# Patient Record
Sex: FEMALE | Race: WHITE | NOT HISPANIC OR LATINO | Employment: STUDENT | URBAN - METROPOLITAN AREA
[De-identification: names, ages, dates, MRNs, and addresses within clinical notes are randomized per-mention and may not be internally consistent; named-entity substitution may affect disease eponyms.]

---
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
## 2024-01-02 ENCOUNTER — PATIENT OUTREACH (OUTPATIENT)
Dept: CARE COORDINATION | Facility: CLINIC | Age: 19
End: 2024-01-02
Payer: COMMERCIAL

## 2024-01-02 NOTE — PROGRESS NOTES
[facility]/Voicemail    Clinical Data: Care Coordinator Outreach    Outreach Documentation Number of Outreach Attempt   1/2/2024   2:13 PM 1       Left message on patient's voicemail with call back information and requested return call.    Plan:   Care Coordinator will try to reach patient again in 3-5 business days.    Next outreach due 1/9/24

## 2024-01-10 ENCOUNTER — PATIENT OUTREACH (OUTPATIENT)
Dept: CARE COORDINATION | Facility: CLINIC | Age: 19
End: 2024-01-10
Payer: COMMERCIAL

## 2024-01-10 NOTE — PROGRESS NOTES
Clinic Care Coordination Contact    Situation: Patient chart reviewed by care coordinator.    Background: Patient is enrolled     Assessment: CHW attempting to reach patient for update     Plan/Recommendations: SW will review in 1-2 weeks     GÉNESIS Vargas, MSW   [facility]  Care Coordination  [facility]  [phone]  1/10/2024 10:39 AM

## 2024-01-10 NOTE — LETTER
[facility] CARE COORDINATION  [address] [facility] [place] [address]   January 10, 2024        Patty Alejandre  [address] [address] [address]          Dear aPtty,     Gisella is an updated Patient Centered Plan of Care for your continued enrollment in Care Coordination. Please let us know if you have additional questions, concerns, or goals that we can assist with.    Sincerely,    Vaishnavi De Jesus, DAVIDW, MSW Clinic   [facility]  Care Coordination  [facility]   [email or web address]  Office: [phone]  Employed by [employer] [facility]  Patient Centered Plan of Care  About Me:        Patient Name:  Patty Alejandre    YOB: 2005  Age:         18 year old   [place] MRN:    [id number] Telephone Information:  Home Phone [phone]   Mobile [phone]       Address:  [address] [place] [address] Email address:  [email or web address]      Emergency Contact(s)    Name Relationship Lgl Grd Work Phone Home Phone Mobile Phone   1. DEYANIRA ALEJANDRE Mother   [phone]    2. MIMI ALEJANDRE Father   [phone]   3. ALONSO PEACOCK Grandparent   [phone]            Primary language:  English     needed? No   [place] [employer]:  [phone] op. 1  Other communication barriers:Cognitive impairment; Other (cannot write much, cannot talk on the phone)    Preferred Method of Communication:  Phone  Current living arrangement: I live in a private home with family (parents)    Mobility Status/ Medical Equipment: Independent        Health Maintenance  Health Maintenance Reviewed: Due/Overdue   Health Maintenance Due   Topic Date Due    COVID-19 Vaccine (1) Never done    HIV SCREENING  Never done    PHQ-2 (once per calendar year)  01/01/2024          My Access Plan  Medical Emergency 911   Primary Clinic Line [facility] - [phone]   24  Hour Appointment Line [phone] or  [phone]) (toll-free)   24 Hour Nurse Line [phone] (toll-free)   Preferred Urgent Care [facility], [phone]     Preferred Hospital [facility] [place]  [phone]     [id number] [place] [place] [facility] [address] [address]     Behavioral Health Crisis Line The National Suicide Prevention Lifeline at 1-679.570.2595 or Text/Call 258           My Care Team Members  Patient Care Team         Relationship Specialty Notifications Start End    Katie Starks APRN CNP PCP - General Family Medicine  10/25/23     Phone: [phone] Fax: [phone] [address] [facility] [address]    Vaishnavi De Jesus BSW Lead Care Coordinator Primary Care - CC Admissions 10/26/23     Phone: [phone] Fax: [phone]        Katie Starks APRN CNP Assigned PCP   10/28/23     Phone: [phone] Fax: [phone] [address] [facility] [address]    Rimma Sharma, CHW Community Health Worker Primary Care - CC Admissions 10/30/23     Phone: [phone] Fax: [phone]                    My Care Plans  Self Management and Treatment Plan    Care Plan  Care Plan: Guardianship process       Problem: Patient's mother unable to assist with medical decisions/coordination of appts       Goal: Patient's mother will pursue guardianship or least restrictive option to allow her to assist with patient's medical trreatment       Start Date: 10/30/2023 Expected End Date: 3/22/2024    This Visit's Progress: 40% Recent Progress: 20%    Priority: High    Note:     Barriers: patient is unable to talk on the phone, coordinate own medical appts/treatment.  Needs assistance with above   Strengths: patient's mother is attempting to obtain guardianship to assist with patient's medical treatment needs.  SW provided information on this process.  Mother will pursue   Patient  expressed understanding of goal: yes  Action steps to achieve this goal:  1. Patient's mother will find time to research information given by SW   2. Patient's mother will discuss guardianship or least restrictive alternative options which allowed ht assist with coordinating of appts for patient   3. Patient's mother will request assistance/advocacy as needed from above agencies for this process   4. Patient's mother will request further resources from  as needed                               Action Plans on File: none                      Advance Care Plans/Directives:   Advanced Care Plan/Directives on file:   No    Discussed with patient/caregiver(s):   Declined Further Information             My Medical and Care Information  Problem List   Patient Active Problem List   Diagnosis    Pervasive developmental disorder, active    ADHD (attention deficit hyperactivity disorder)    Anxiety    Sleep problems        Care Coordination Start Date: 10/25/2023   Frequency of Care Coordination: monthly, more frequently as needed     Form Last Updated: 01/10/2024

## 2024-01-12 ENCOUNTER — PATIENT OUTREACH (OUTPATIENT)
Dept: CARE COORDINATION | Facility: CLINIC | Age: 19
End: 2024-01-12
Payer: COMMERCIAL

## 2024-01-12 NOTE — PROGRESS NOTES
[facility]/Voicemail    Clinical Data: Care Coordinator Outreach    Outreach Documentation Number of Outreach Attempt   1/2/2024   2:13 PM 1   1/12/2024   3:41 PM 2       Left message on patient's voicemail with call back information and requested return call.    Plan:   Care Coordinator will try to reach patient again in 10 business days.    Next outreach due: 1/25/24

## 2024-01-16 ENCOUNTER — PATIENT OUTREACH (OUTPATIENT)
Dept: CARE COORDINATION | Facility: CLINIC | Age: 19
End: 2024-01-16
Payer: COMMERCIAL

## 2024-01-16 NOTE — PROGRESS NOTES
Clinic Care Coordination Contact    Situation: Patient chart reviewed by care coordinator.    Background: Patient is enrolled     Assessment: CHW has made two unsuccessful calls, has sent Unable to Contact letter with plan to call again the end of the month     Plan/Recommendations: SW will review in 1-2 weeks     GÉNESIS Vargas, MSW   [facility]  Care Coordination  [facility]  [phone]  1/16/2024 5:02 PM

## 2024-01-31 ENCOUNTER — PATIENT OUTREACH (OUTPATIENT)
Dept: CARE COORDINATION | Facility: CLINIC | Age: 19
End: 2024-01-31
Payer: COMMERCIAL

## 2024-01-31 NOTE — PROGRESS NOTES
Clinic Care Coordination Contact  Community Health Worker Follow Up    Care Gaps:     Health Maintenance Due   Topic Date Due    COVID-19 Vaccine (1) Never done    HIV SCREENING  Never done    PHQ-2 (once per calendar year)  01/01/2024       Patient will address at next PCP visit     Care Plan:   Care Plan: Guardianship process       Problem: Patient's mother unable to assist with medical decisions/coordination of appts       Goal: Patient's mother will pursue guardianship or least restrictive option to allow her to assist with patient's medical trreatment       Start Date: 10/30/2023 Expected End Date: 3/22/2024    This Visit's Progress: 50% Recent Progress: 40%    Priority: High    Note:     Barriers: patient is unable to talk on the phone, coordinate own medical appts/treatment.  Needs assistance with above   Strengths: patient's mother is attempting to obtain guardianship to assist with patient's medical treatment needs.  SW provided information on this process.  Mother will pursue   Patient expressed understanding of goal: yes  Action steps to achieve this goal:  1. Patient's mother will find time to research information given by SW   2. Patient's mother will discuss guardianship or least restrictive alternative options which allowed ht assist with coordinating of appts for patient   3. Patient's mother will request assistance/advocacy as needed from above agencies for this process   4. Patient's mother will request further resources from  as needed                               Intervention and Education during outreach: Patient's mother states that she has placed a call to [facility] to work with an advocate for autism about Patty's guardianship. She has not heard back yet. She is going to give it a few more days before calling again. CHW asked that if she doesn't hear back within a week to reach back out to CHW to see what other resources might be available.     CHW Plan: CHW will continue to support  patient with goals through routine scheduled outreach.     Next outreach due:  3/1/24

## 2024-02-02 ENCOUNTER — PATIENT OUTREACH (OUTPATIENT)
Dept: CARE COORDINATION | Facility: CLINIC | Age: 19
End: 2024-02-02
Payer: COMMERCIAL

## 2024-02-02 NOTE — PROGRESS NOTES
Clinic Care Coordination Contact  Care Coordination Clinician Chart Review    Situation: Patient chart reviewed by Care Coordinator.       Background: Care Coordination Program started: 10/25/2023. Initial assessment completed and patient-centered care plan(s) were developed with participation from patient. Lead CC handed patient off to CHW for continued outreaches.       Assessment: Per chart review, patient outreach completed by CC CHW on 1/31/2024. Per CHW note, patient will address care gaps at next clinic visit. This visit is not yet scheduled.  Patient's mother left message for [facility] hoping to obtain an advocate in her journey to become patient's guardian.  Patient has dx of autism  Patient is actively working to accomplish goal(s). Patient's goal(s) appropriate and relevant at this time. Patient is not due for updated Plan of Care.  Assessments will be completed annually or as needed/with change of patient status.      Care Plan: Guardianship process       Problem: Patient's mother unable to assist with medical decisions/coordination of appts       Goal: Patient's mother will pursue guardianship or least restrictive option to allow her to assist with patient's medical trreatment       Start Date: 10/30/2023 Expected End Date: 3/22/2024    This Visit's Progress: 60% Recent Progress: 50%    Priority: High    Note:     Barriers: patient is unable to talk on the phone, coordinate own medical appts/treatment.  Needs assistance with above   Strengths: patient's mother is attempting to obtain guardianship to assist with patient's medical treatment needs.  SW provided information on this process.  Mother will pursue   Patient expressed understanding of goal: yes  Action steps to achieve this goal:  1. Patient's mother will find time to research information given by [employer]   2. Patient's mother will discuss guardianship or least restrictive alternative options which allowed ht assist with coordinating of appts for  patient   3. Patient's mother will request assistance/advocacy as needed from above agencies for this process   4. Patient's mother will request further resources from  as needed                                  Plan/Recommendations: The patient will continue working with Care Coordination to achieve goal(s) as above. CHW will continue outreaches at minimum every 30 days and will involve Lead CC as needed or if patient is ready to move to Maintenance. Lead CC will continue to monitor CHW outreaches and patient's progress to goal(s) every 6 weeks.     Plan of Care updated and sent to patient: No    GÉNESIS Vargas, MSW   [facility]  Care Coordination  [facility]  [phone]  2/2/2024 2:01 PM

## 2024-03-08 ENCOUNTER — PATIENT OUTREACH (OUTPATIENT)
Dept: CARE COORDINATION | Facility: CLINIC | Age: 19
End: 2024-03-08
Payer: COMMERCIAL

## 2024-03-08 NOTE — PROGRESS NOTES
Clinic Care Coordination Contact  Community Health Worker Follow Up    Care Gaps:     Health Maintenance Due   Topic Date Due    HIV SCREENING  Never done    COVID-19 Vaccine (1 - 2023-24 season) Never done    PHQ-2 (once per calendar year)  01/01/2024       Will discuss at next PCP visit    Care Plan:   Care Plan: Guardianship process       Problem: Patient's mother unable to assist with medical decisions/coordination of appts       Goal: Patient's mother will pursue guardianship or least restrictive option to allow her to assist with patient's medical trreatment       Start Date: 10/30/2023 Expected End Date: 3/22/2024    This Visit's Progress: 70% Recent Progress: 60%    Priority: High    Note:     Barriers: patient is unable to talk on the phone, coordinate own medical appts/treatment.  Needs assistance with above   Strengths: patient's mother is attempting to obtain guardianship to assist with patient's medical treatment needs.  SW provided information on this process.  Mother will pursue   Patient expressed understanding of goal: yes  Action steps to achieve this goal:  1. Patient's mother will find time to research information given by SW   2. Patient's mother will discuss guardianship or least restrictive alternative options which allowed ht assist with coordinating of appts for patient   3. Patient's mother will request assistance/advocacy as needed from above agencies for this process   4. Patient's mother will request further resources from  as needed                               Intervention and Education during outreach: Patient's mother states that they have been having a few issues lately with patient not keeping her room clean, sneaking food and not being truthful about it. She has a birthday coming in a few days and they are getting her a gecko. They are hoping this will teach her responsibility.     Mom has an appointment today with a  to move forward with guardianship. Patient is not able  to live on her own. She will attend her school program until she turns 20 and then they are hoping to get her into a program that will assist with some job training that she will attend daily for a few hours a day.     CHW Plan: CHW will continue to support patient with goals through routine scheduled outreach.     Next outreach due: 4/9/24

## 2024-03-14 ENCOUNTER — PATIENT OUTREACH (OUTPATIENT)
Dept: CARE COORDINATION | Facility: CLINIC | Age: 19
End: 2024-03-14
Payer: COMMERCIAL

## 2024-03-14 NOTE — PROGRESS NOTES
Clinic Care Coordination Contact  Care Coordination Clinician Chart Review    Situation: Patient chart reviewed by Care Coordinator.       Background: Care Coordination Program started: 10/25/2023. Initial assessment completed and patient-centered care plan(s) were developed with participation from patient. Lead CC handed patient off to CHW for continued outreaches.       Assessment: Per chart review, patient outreach completed by CC CHW on 3/8/2024.  Per CHW note, patient's mother will address care gaps at future clinic visit.  This is not yet scheduled.  Patient's mother is attempting guardianship for patient.  Patient's mother reports that she was to have an appt on 3/8/2024 with a  to discuss next steps for guardianship. Patient is unable to live on her own. Patient will attend her school program until she turns 20 and the future goal is for a program that will provide job training.  Patient's mother reports patient has not been keeping up her room and they are giving her a gecko for her birthday in hopes it will teach responsibility.   Patient is actively working to accomplish goal(s). Patient's goal(s) appropriate and relevant at this time. Patient is not due for updated Plan of Care.  Assessments will be completed annually or as needed/with change of patient status.      Care Plan: Guardianship process       Problem: Patient's mother unable to assist with medical decisions/coordination of appts       Goal: Patient's mother will pursue guardianship or least restrictive option to allow her to assist with patient's medical trreatment       Start Date: 10/30/2023 Expected End Date: 6/21/2024    This Visit's Progress: 80% Recent Progress: 70%    Priority: High    Note:     Barriers: patient is unable to talk on the phone, coordinate own medical appts/treatment.  Needs assistance with above   Strengths: patient's mother is attempting to obtain guardianship to assist with patient's medical treatment needs.  Upcoming appt with a  and patient's mother to discuss steps to guardianship  Patient expressed understanding of goal: yes  Action steps to achieve this goal:  1. Patient's mother will find time to research information given by SW   2. Patient's mother will discuss guardianship or least restrictive alternative options which allowed ht assist with coordinating of appts for patient   3. Patient's mother will request assistance/advocacy as needed from above agencies for this process   4. Patient's mother will request further resources from  as needed                                  Plan/Recommendations: The patient will continue working with Care Coordination to achieve goal(s) as above. CHW will continue outreaches at minimum every 30 days and will involve Lead CC as needed or if patient is ready to move to Maintenance. Lead CC will continue to monitor CHW outreaches and patient's progress to goal(s) every 6 weeks.     Plan of Care updated and sent to patient: No    GÉNESIS Vargas, MSW   [facility]  Care Coordination  [facility]  [phone]  3/14/2024 8:08 AM

## 2024-03-14 NOTE — LETTER
[facility] CARE COORDINATION  [address] [place] [address]   April 4, 2024        Patty Alejandre  [address] [address]          Dear Patty,     Gisella is an updated Patient Centered Plan of Care for your continued enrollment in Care Coordination. Please let us know if you have additional questions, concerns, or goals that we can assist with.    Sincerely,    Vaishnavi De Jesus, DAVIDW, MSW Clinic   [facility]  Care Coordination  [facility]   [email or web address]  Office: [phone]  Employed by [facility] [employer] [employer] [facility]  Patient Centered Plan of Care  About Me:        Patient Name:  Patty Alejandre    YOB: 2005  Age:         19 year old   [place] MRN:    [id number] Telephone Information:  Home Phone [phone]   Mobile [phone]       Address:  [address] [address] Email address:  [email or web address]      Emergency Contact(s)    Name Relationship Lgl Grd Work Phone Home Phone Mobile Phone   1. DEYANIRA ALEJANDRE Mother   [phone]    2. MIMI ALEJANDRE Father   [phone]   3. ALONSO PEACOCK Grandparent   [phone]            Primary language:  English     needed? No   [place] [employer] [facility]:  [phone] op. 1  Other communication barriers:Cognitive impairment; Other (cannot write much, cannot talk on the phone)    Preferred Method of Communication:  Phone  Current living arrangement: I live in a private home with family (parents)    Mobility Status/ Medical Equipment: Independent        Health Maintenance  Health Maintenance Reviewed: Due/Overdue   Health Maintenance Due   Topic Date Due    HIV SCREENING  Never done    COVID-19 Vaccine (1 - 2023-24 season) Never done    PHQ-2 (once per calendar year)  01/01/2024          My Access Plan  Medical Emergency 911   Primary Clinic Line [facility]  [phone]   24 Hour Appointment Line [phone] or  [phone] ([phone]) (toll-free)   24 Hour Nurse Line [phone] (toll-free)   Preferred Urgent Care [facility], [phone]     Preferred Hospital [facility] [place]  [phone]     Preferred Pharmacy [id number] - [place], [place] - [address] [address]     Behavioral Health Crisis Line The National Suicide Prevention Lifeline at 1-383.615.3598 or Text/Call 698           My Care Team Members  Patient Care Team         Relationship Specialty Notifications Start End    Katie Starks APRN CNP PCP - General Family Medicine  10/25/23     Phone: [phone] Fax: [phone] [address] [place] [address]    Vaishnavi De Jesus BSW Lead Care Coordinator Primary Care - CC Admissions 10/26/23     Phone: [phone] Fax: [phone]        Katie Starks APRN CNP Assigned PCP   10/28/23     Phone: [phone] Fax: [phone] [address] [address] [address]    Rimma Sharma CHW Community Health Worker Primary Care - CC Admissions 10/30/23     Phone: [phone] Fax: [phone]                    My Care Plans  Self Management and Treatment Plan    Care Plan  Care Plan: Guardianship process       Problem: Patient's mother unable to assist with medical decisions/coordination of appts       Goal: Patient's mother will pursue guardianship or least restrictive option to allow her to assist with patient's medical trreatment       Start Date: 10/30/2023 Expected End Date: 6/21/2024    This Visit's Progress: 80% Recent Progress: 70%    Priority: High    Note:     Barriers: patient is unable to talk on the phone, coordinate own medical appts/treatment.  Needs assistance with above   Strengths: patient's mother is attempting to obtain guardianship to assist with patient's medical treatment needs. Upcoming appt with a  and patient's mother to discuss  steps to guardianship  Patient expressed understanding of goal: yes  Action steps to achieve this goal:  1. Patient's mother will find time to research information given by SW   2. Patient's mother will discuss guardianship or least restrictive alternative options which allowed ht assist with coordinating of appts for patient   3. Patient's mother will request assistance/advocacy as needed from above agencies for this process   4. Patient's mother will request further resources from  as needed                               Action Plans on File: none                      Advance Care Plans/Directives:   Advanced Care Plan/Directives on file:   No    Discussed with patient/caregiver(s):   Declined Further Information             My Medical and Care Information  Problem List   Patient Active Problem List   Diagnosis    Pervasive developmental disorder, active    ADHD (attention deficit hyperactivity disorder)    Anxiety    Sleep problems          Care Coordination Start Date: 10/25/2023   Frequency of Care Coordination: monthly, more frequently as needed     Form Last Updated: 04/04/2024

## 2024-04-01 ENCOUNTER — PATIENT OUTREACH (OUTPATIENT)
Dept: CARE COORDINATION | Facility: CLINIC | Age: 19
End: 2024-04-01
Payer: COMMERCIAL

## 2024-04-01 NOTE — PROGRESS NOTES
[facility]/Voicemail    Clinical Data: Care Coordinator Outreach    Outreach Documentation Number of Outreach Attempt   4/1/2024  10:45 AM 1       Left message on patient's voicemail with call back information and requested return call.    Plan:   Care Coordinator will try to reach patient again in 3-5 business days.    Next outreach due: 4/9/24

## 2024-04-09 ENCOUNTER — PATIENT OUTREACH (OUTPATIENT)
Dept: CARE COORDINATION | Facility: CLINIC | Age: 19
End: 2024-04-09
Payer: COMMERCIAL

## 2024-04-09 NOTE — PROGRESS NOTES
[facility]/Voicemail    Clinical Data: Care Coordinator Outreach    Outreach Documentation Number of Outreach Attempt   4/1/2024  10:45 AM 1   4/9/2024   3:58 PM 2       Left message on patient's voicemail with call back information and requested return call.    Plan:   Care Coordinator will try to reach patient again in 10 business days.    Next outreach due: 4/23/24

## 2024-04-23 ENCOUNTER — PATIENT OUTREACH (OUTPATIENT)
Dept: CARE COORDINATION | Facility: CLINIC | Age: 19
End: 2024-04-23
Payer: COMMERCIAL

## 2024-04-23 NOTE — PROGRESS NOTES
Clinic Care Coordination Contact    Situation: Patient chart reviewed by care coordinator.    Background: Patient is enrolled     Assessment: CHW has made two unsuccessful attempts to reach patient/family, will call again at end of the month     Plan/Recommendations: SW will review in 1-2 weeks     GÉNESIS Vargas, MSW   [facility]  Care Coordination  [facility]  [phone]  4/23/2024 9:02 AM

## 2024-04-26 ENCOUNTER — PATIENT OUTREACH (OUTPATIENT)
Dept: CARE COORDINATION | Facility: CLINIC | Age: 19
End: 2024-04-26
Payer: COMMERCIAL

## 2024-04-26 NOTE — PROGRESS NOTES
Clinic Care Coordination Contact  Community Health Worker Follow Up    Care Gaps:     Health Maintenance Due   Topic Date Due    HIV SCREENING  Never done    COVID-19 Vaccine (1 - 2023-24 season) Never done    PHQ-2 (once per calendar year)  01/01/2024       Will discuss at next PCP visit     Care Plan:   Care Plan: Guardianship process       Problem: Patient's mother unable to assist with medical decisions/coordination of appts       Goal: Patient's mother will pursue guardianship or least restrictive option to allow her to assist with patient's medical trreatment       Start Date: 10/30/2023 Expected End Date: 6/21/2024    This Visit's Progress: 90% Recent Progress: 80%    Priority: High    Note:     Barriers: patient is unable to talk on the phone, coordinate own medical appts/treatment.  Needs assistance with above   Strengths: patient's mother is attempting to obtain guardianship to assist with patient's medical treatment needs. Upcoming appt with a  and patient's mother to discuss steps to guardianship  Patient expressed understanding of goal: yes  Action steps to achieve this goal:  1. Patient's mother will find time to research information given by SW   2. Patient's mother will discuss guardianship or least restrictive alternative options which allowed ht assist with coordinating of appts for patient   3. Patient's mother will request assistance/advocacy as needed from above agencies for this process   4. Patient's mother will request further resources from  as needed                               Intervention and Education during outreach: Mom states that they have sent in paperwork to the  for patient's guardianship and they are now waiting on next steps. Things have been going well, she hasn't been exhibiting any more increased defiance.     CHW Plan: CHW will continue to support patient with goals through routine scheduled outreach.     Next outreach due: 5/28/24

## 2024-05-01 ENCOUNTER — PATIENT OUTREACH (OUTPATIENT)
Dept: CARE COORDINATION | Facility: CLINIC | Age: 19
End: 2024-05-01
Payer: COMMERCIAL

## 2024-05-01 NOTE — PROGRESS NOTES
Clinic Care Coordination Contact  Care Coordination Clinician Chart Review    Situation: Patient chart reviewed by Care Coordinator.       Background: Care Coordination Program started: 10/25/2023. Initial assessment completed and patient-centered care plan(s) were developed with participation from patient. Lead CC handed patient off to CHW for continued outreaches.       Assessment: Per chart review, patient outreach completed by CC CHW on 4/26/2024.  Per CHW note, patient's mother will call and address care gaps. Patient's mother states guardian paperwork has been sent to the  and now are waiting on next steps. Mother reported patient hasn't been exhibiting increased defiance.   Patient is actively working to accomplish goal(s). Patient's goal(s) appropriate and relevant at this time. Patient is not due for updated Plan of Care.  Assessments will be completed annually or as needed/with change of patient status.      Care Plan: Guardianship process       Problem: Patient's mother unable to assist with medical decisions/coordination of appts       Goal: Patient's mother will pursue guardianship or least restrictive option to allow her to assist with patient's medical trreatment       Start Date: 10/30/2023 Expected End Date: 8/23/2024    Recent Progress: 90%    Priority: High    Note:     Barriers: patient is unable to talk on the phone, coordinate own medical appts/treatment.  Needs assistance with above   Strengths: patient's mother is attempting to obtain guardianship to assist with patient's medical treatment needs. Upcoming appt with a  and patient's mother to discuss steps to guardianship  Patient expressed understanding of goal: yes  Action steps to achieve this goal:  1. Patient's mother will find time to research information given by SW   2. Patient's mother will discuss guardianship or least restrictive alternative options which allowed ht assist with coordinating of appts for patient   3.  Patient's mother will request assistance/advocacy as needed from above agencies for this process   4. Patient's mother will request further resources from  as needed                             Care Plan: Health Maintenance       Problem: Health Maintenance Due or Overdue       Goal: Become up-to-date with health maintenance visit(s)       Start Date: 5/1/2024 Expected End Date: 8/30/2024    This Visit's Progress: 0%    Priority: Medium    Note:     Barriers:due/overdue for HIV screening and Covid vaccine   Strengths: patient's mother will call and schedule clinic appt   Patient expressed understanding of goal: yes  Action steps to achieve this goal:  1. Patient's mother will find time to call [facility] scheduling   2. Patient's mother will schedule clinic appt  3. Patient/mother will address care gaps                                  Plan/Recommendations: The patient will continue working with Care Coordination to achieve goal(s) as above. CHW will continue outreaches at minimum every 30 days and will involve Lead CC as needed or if patient is ready to move to Maintenance. Lead CC will continue to monitor CHW outreaches and patient's progress to goal(s) every 6 weeks.     Plan of Care updated and sent to patient: No    Vaishnavi De Jesus, DAVIDW, MSW   [facility]  Care Coordination  [facility]  [phone]  5/1/2024 1:42 PM

## 2024-05-08 ENCOUNTER — TELEPHONE (OUTPATIENT)
Dept: FAMILY MEDICINE | Facility: CLINIC | Age: 19
End: 2024-05-08
Payer: COMMERCIAL

## 2024-05-08 NOTE — TELEPHONE ENCOUNTER
Forms received from: [employer] [facility]    Phone number listed: [phone]   Fax listed: [phone]  Date received: 5/8/24  Form description: Establish guardianship form  Once forms are completed, please return to [employer] [facility]  via fax.  Is patient requesting to be contacted when forms are completed: na  Phone: na  Form placed:  Katie Yusuf

## 2024-05-13 NOTE — TELEPHONE ENCOUNTER
Is been sometime since I have seen patient.  Needs appointment-can be virtual.  Need both Patty and mother to be present.    Katie Starks NP-C

## 2024-05-16 ENCOUNTER — VIRTUAL VISIT (OUTPATIENT)
Dept: FAMILY MEDICINE | Facility: CLINIC | Age: 19
End: 2024-05-16
Payer: COMMERCIAL

## 2024-05-16 DIAGNOSIS — F84.0 AUTISM SPECTRUM DISORDER: ICD-10-CM

## 2024-05-16 DIAGNOSIS — F84.9 PERVASIVE DEVELOPMENTAL DISORDER, ACTIVE: Primary | ICD-10-CM

## 2024-05-16 DIAGNOSIS — F90.0 ATTENTION DEFICIT HYPERACTIVITY DISORDER (ADHD), PREDOMINANTLY INATTENTIVE TYPE: ICD-10-CM

## 2024-05-16 PROCEDURE — 99442 PR PHYSICIAN TELEPHONE EVALUATION 11-20 MIN: CPT | Mod: 95 | Performed by: NURSE PRACTITIONER

## 2024-05-16 NOTE — PROGRESS NOTES
Patty is a 19 year old who is being evaluated via a billable video visit.    How would you like to obtain your AVS? Mail Copy  If the video visit is dropped, the invitation should be resent by: Text to cell phone: [phone]  Will anyone else be joining your video visit? No      Assessment & Plan     Pervasive developmental disorder, active  Attention deficit hyperactivity disorder (ADHD), predominantly inattentive type  Autism spectrum disorder  Patty is lacking decision-making capability regarding personal, health and financial decisions.  Unable to care for personal adult needs.  Statement of support of guardianship form completed.        Subjective   Patty is a 19 year old, presenting for the following health issues:  Forms  Patients mother stated this visit is for forms to determine guardianship.       5/16/2024     8:38 AM   Additional Questions   Roomed by Radha CARRILLO MA   Accompanied by Mom Renea         5/16/2024     8:38 AM   Patient Reported Additional Medications   Patient reports taking the following new medications None     Video Start Time: 2:34 PM    HPI             Objective           Vitals:  No vitals were obtained today due to virtual visit.    Visit converted to phone call due to connectivity issues.  Patty with history of pervasive developmental disorder.  Has been seen by the [facility] and also at [place] [facility] and was diagnosed with ADHD and autism spectrum disorder.  Since turning 17 parents have had difficulty in scheduling appointments for healthcare and also routine dental care due to not having guardianship.  They have been attempting to gain guardianship but has been difficult.  Has been working with the [facility].  Have forms that would like to have completed by provider to support guardian ship.  Patty continues to go to school/work during the day.  Patty does not read, unable to write, unable to operate a phone.  Does not know phone number or address.   Does state that if was really ill and needed medical assistance would call 911 but is uncertain how to operate a phone. Parents would like to obtain guardianship to help ensure Patty's safety and to be able to help with medical, financial and personal decisions.        Physical Exam   No physical exam completed-phone call visit          Phone call: 13 minutes  Originating Location (pt. Location): Home    Distant Location (provider location):  Off-site    Signed Electronically by: LOREN Ernandez CNP

## 2024-06-06 ENCOUNTER — PATIENT OUTREACH (OUTPATIENT)
Dept: CARE COORDINATION | Facility: CLINIC | Age: 19
End: 2024-06-06
Payer: COMMERCIAL

## 2024-06-06 NOTE — PROGRESS NOTES
Clinic Care Coordination Contact  Community Health Worker Follow Up    Care Gaps:     Health Maintenance Due   Topic Date Due    HIV SCREENING  Never done    COVID-19 Vaccine (1 - 2023-24 season) Never done    PHQ-2 (once per calendar year)  01/01/2024       Will discuss with PCP at next visit. Not due for annual until Oct    Care Plan:   Care Plan: Guardianship process       Problem: Patient's mother unable to assist with medical decisions/coordination of appts       Goal: Patient's mother will pursue guardianship or least restrictive option to allow her to assist with patient's medical trreatment       Start Date: 10/30/2023 Expected End Date: 8/23/2024    This Visit's Progress: On track Recent Progress: 90%    Priority: High    Note:     Barriers: patient is unable to talk on the phone, coordinate own medical appts/treatment.  Needs assistance with above   Strengths: patient's mother is attempting to obtain guardianship to assist with patient's medical treatment needs. Upcoming appt with a  and patient's mother to discuss steps to guardianship  Patient expressed understanding of goal: yes  Action steps to achieve this goal:  1. Patient's mother will find time to research information given by    2. Patient's mother will discuss guardianship or least restrictive alternative options which allowed ht assist with coordinating of appts for patient   3. Patient's mother will request assistance/advocacy as needed from above agencies for this process   4. Patient's mother will request further resources from  as needed     Has guardianship hearing on 6/18                            Care Plan: Health Maintenance       Problem: Health Maintenance Due or Overdue       Goal: Become up-to-date with health maintenance visit(s)       Start Date: 5/1/2024 Expected End Date: 8/30/2024    This Visit's Progress: 30% Recent Progress: 0%    Priority: Medium    Note:     Barriers:due/overdue for HIV screening and Covid vaccine    Strengths: patient's mother will call and schedule clinic appt   Patient expressed understanding of goal: yes  Action steps to achieve this goal:  1. Patient's mother will find time to call [place] scheduling   2. Patient's mother will schedule clinic appt  3. Patient/mother will address care gaps                               Intervention and Education during outreach: Patient is doing well. Mom has guardianship hearing scheduled for 6/18 to be her medical decision maker and POA. They are sure this will be granted and understand that they will be helping her manage her money not controlling it.   They will also discuss with the  if patient will need to complete an advanced care directive if parent is her legal decision maker for her medical care.     CHW Plan: CHW will continue to support patient with goals through routine scheduled outreach.     Next outreach due: 7/8/24

## 2024-06-12 ENCOUNTER — PATIENT OUTREACH (OUTPATIENT)
Dept: CARE COORDINATION | Facility: CLINIC | Age: 19
End: 2024-06-12
Payer: COMMERCIAL

## 2024-06-12 NOTE — PROGRESS NOTES
Clinic Care Coordination Contact  Care Coordination Clinician Chart Review    Situation: Patient chart reviewed by Care Coordinator.       Background: Care Coordination Program started: 10/25/2023. Initial assessment completed and patient-centered care plan(s) were developed with participation from patient. Lead CC handed patient off to CHW for continued outreaches.       Assessment: Per chart review, patient outreach completed by CC CHW on 6/6/2024.  Patient not due for annual visit until October 2024.  Per CHW note, Patient's mother has guardianship hearing scheduled for 6/18 to be her medical decision maker and POA. They are sure this will be granted and understand that they will be helping her manage her money not controlling it.     Family will also discuss with the  if patient will need to complete an advanced care directive if parent is her legal decision maker for her medical care.     Patient is actively working to accomplish goal(s). Patient's goal(s) appropriate and relevant at this time. Patient is not due for updated Plan of Care.  Assessments will be completed annually or as needed/with change of patient status.      Care Plan: Guardianship process       Problem: Patient's mother unable to assist with medical decisions/coordination of appts       Goal: Patient's mother will pursue guardianship or least restrictive option to allow her to assist with patient's medical trreatment       Start Date: 10/30/2023 Expected End Date: 8/23/2024    This Visit's Progress: 80% Recent Progress: On track    Priority: High    Note:     Barriers: patient is unable to talk on the phone, coordinate own medical appts/treatment.  Needs assistance with above   Strengths: patient's mother is attempting to obtain guardianship to assist with patient's medical treatment needs. Upcoming appt with a  and patient's mother to discuss steps to guardianship  Patient expressed understanding of goal: yes  Action steps to  achieve this goal:  1. Patient's mother will find time to research information given by    2. Patient's mother will discuss guardianship or least restrictive alternative options which allowed ht assist with coordinating of appts for patient   3. Patient's mother will request assistance/advocacy as needed from above agencies for this process   4. Patient's mother will request further resources from  as needed     Has guardianship hearing on 6/18                            Care Plan: Health Maintenance       Problem: Health Maintenance Due or Overdue       Goal: Become up-to-date with health maintenance visit(s)       Start Date: 5/1/2024 Expected End Date: 10/15/2024    Recent Progress: 30%    Priority: Medium    Note:     Barriers:due/overdue for HIV screening and Covid vaccine   Strengths: patient's mother will call and schedule clinic appt   Patient expressed understanding of goal: yes  Action steps to achieve this goal:  1. Patient's mother will find time to call [place] scheduling   2. Patient's mother will schedule clinic appt  3. Patient/mother will address care gaps                                  Plan/Recommendations: The patient will continue working with Care Coordination to achieve goal(s) as above. CHW will continue outreaches at minimum every 30 days and will involve Lead CC as needed or if patient is ready to move to Maintenance. Lead CC will continue to monitor CHW outreaches and patient's progress to goal(s) every 6 weeks.     Plan of Care updated and sent to patient: No    Vaishnavi De Jesus, DAVIDW, MSW   [facility]  Care Coordination  [facility]  [phone]  6/12/2024 11:17 AM

## 2024-06-12 NOTE — LETTER
[facility]  [address] [place] [address]   June 28, 2024        Patty Alejandre  [address] [address] [address]          Dear Patty,     Gisella is an updated Patient Centered Plan of Care for your continued enrollment in Care Coordination. Please let us know if you have additional questions, concerns, or goals that we can assist with.    Sincerely,    Vaishnavi De Jesus, DAVIDW, MSW Clinic   [facility]  Care Coordination  [facility]   [email or web address]  Office: [phone]  Employed by [employer] [facility]  Patient Centered Plan of Care  About Me:        Patient Name:  Patty Alejandre    YOB: 2005  Age:         19 year old   [facility] MRN:    [id number] Telephone Information:  Home Phone [phone]   Mobile [phone]       [place]  [address] [address] Email address:  [email or web address]      Emergency Contact(s)    Name Relationship Lgl Grd Work Phone Home Phone Mobile Phone   1. DEYANIRA ALEJANDRE Mother   [phone]    2. MIMI ALEJANDRE Father   [phone]   3. ALONSO PEACOCK Grandparent   [phone]            Primary language:  English     needed? No   [employer]:  [phone] op. 1  Other communication barriers:Cognitive impairment; Other (cannot write much, cannot talk on the phone)    Preferred Method of Communication:  Phone, mail   Current living arrangement: I live in a private home with family (parents)    Mobility Status/ Medical Equipment: Independent        Health Maintenance  Health Maintenance Reviewed: Due/Overdue   Health Maintenance Due   Topic Date Due    HIV SCREENING  Never done    COVID-19 Vaccine (1 - 2023-24 season) Never done    PHQ-2 (once per calendar year)  01/01/2024          My Access Plan  Medical Emergency 911   Primary Clinic Line [facility]  [facility] [phone]   24 Hour Appointment Line [phone] or  [phone]) (toll-free)   24 Hour Nurse Line [phone] (toll-free)   Preferred Urgent Care [facility], [phone]     Preferred Hospital [facility]  [phone]     Preferred Pharmacy [id number] [place], [place] - [address] [address]     Behavioral Health Crisis Line The National Suicide Prevention Lifeline at 1-682.347.2168 or Text/Call 988           My Care Team Members  Patient Care Team         Relationship Specialty Notifications Start End    Katie Starks APRN CNP PCP - General Family Medicine  10/25/23     Phone: [phone] Fax: [phone] [address] [place] [address]    Vaishnavi De Jesus BSW Lead Care Coordinator Primary Care - CC Admissions 10/26/23     Phone: [phone] Fax: [phone]        Katie Starks APRN CNP Assigned PCP   10/28/23     Phone: [phone] Fax: [phone] [address] [address] [place] [address]    Rimma Sharma CHW Community Health Worker Primary Care - CC Admissions 10/30/23     Phone: [phone] Fax: [phone]                    My Care Plans  Self Management and Treatment Plan    Care Plan  Care Plan: Guardianship process       Problem: Patient's mother unable to assist with medical decisions/coordination of appts       Goal: Patient's mother will pursue guardianship or least restrictive option to allow her to assist with patient's medical trreatment       Start Date: 10/30/2023 Expected End Date: 8/23/2024    This Visit's Progress: 80% Recent Progress: On track    Priority: High    Note:     Barriers: patient is unable to talk on the phone, coordinate own medical appts/treatment.  Needs assistance with above   Strengths: patient's mother is attempting to obtain guardianship to assist with patient's medical treatment needs. Upcoming appt with a  and  patient's mother to discuss steps to guardianship  Patient expressed understanding of goal: yes  Action steps to achieve this goal:  1. Patient's mother will find time to research information given by SW   2. Patient's mother will discuss guardianship or least restrictive alternative options which allowed ht assist with coordinating of appts for patient   3. Patient's mother will request assistance/advocacy as needed from above agencies for this process   4. Patient's mother will request further resources from  as needed     Has guardianship hearing on 6/18                            Care Plan: Health Maintenance       Problem: Health Maintenance Due or Overdue       Goal: Become up-to-date with health maintenance visit(s)       Start Date: 5/1/2024 Expected End Date: 10/15/2024    Recent Progress: 30%    Priority: Medium    Note:     Barriers:due/overdue for HIV screening and Covid vaccine   Strengths: patient's mother will call and schedule clinic appt   Patient expressed understanding of goal: yes  Action steps to achieve this goal:  1. Patient's mother will find time to call [place] scheduling   2. Patient's mother will schedule clinic appt  3. Patient/mother will address care gaps                               Action Plans on File: none                      Advance Care Plans/Directives:   Advanced Care Plan/Directives on file:   No    Discussed with patient/caregiver(s):   Declined Further Information             My Medical and Care Information  Problem List   Patient Active Problem List   Diagnosis    Pervasive developmental disorder, active    ADHD (attention deficit hyperactivity disorder)    Anxiety    Sleep problems    Autism spectrum disorder          Care Coordination Start Date: 10/25/2023   Frequency of Care Coordination: monthly, more frequently as needed     Form Last Updated: 06/28/2024

## 2024-07-02 ENCOUNTER — DOCUMENTATION ONLY (OUTPATIENT)
Dept: OTHER | Facility: CLINIC | Age: 19
End: 2024-07-02
Payer: COMMERCIAL

## 2024-07-10 ENCOUNTER — PATIENT OUTREACH (OUTPATIENT)
Dept: CARE COORDINATION | Facility: CLINIC | Age: 19
End: 2024-07-10
Payer: COMMERCIAL

## 2024-07-10 NOTE — PROGRESS NOTES
Clinic Care Coordination Contact  Community Health Worker Follow Up    Care Gaps:     Health Maintenance Due   Topic Date Due    HIV SCREENING  Never done    COVID-19 Vaccine (1 - 2023-24 season) Never done    PHQ-2 (once per calendar year)  01/01/2024       Patient declining Covid vaccine and HIV screening.     Care Plan:   Care Plan: Guardianship process       Problem: Patient's mother unable to assist with medical decisions/coordination of appts       Goal: Patient's mother will pursue guardianship or least restrictive option to allow her to assist with patient's medical trreatment       Start Date: 10/30/2023 Expected End Date: 8/23/2024    This Visit's Progress: 100% Recent Progress: 80%    Priority: High    Note:     Barriers: patient is unable to talk on the phone, coordinate own medical appts/treatment.  Needs assistance with above   Strengths: patient's mother is attempting to obtain guardianship to assist with patient's medical treatment needs. Upcoming appt with a  and patient's mother to discuss steps to guardianship  Patient expressed understanding of goal: yes  Action steps to achieve this goal:  1. Patient's mother will find time to research information given by    2. Patient's mother will discuss guardianship or least restrictive alternative options which allowed ht assist with coordinating of appts for patient   3. Patient's mother will request assistance/advocacy as needed from above agencies for this process   4. Patient's mother will request further resources from  as needed     Mother granted guardianship on 6/18/24                            Care Plan: Health Maintenance       Problem: Health Maintenance Due or Overdue       Goal: Become up-to-date with health maintenance visit(s)       Start Date: 5/1/2024 Expected End Date: 10/15/2024    This Visit's Progress: 100% Recent Progress: 30%    Priority: Medium    Note:     Barriers:due/overdue for HIV screening and Covid vaccine    Strengths: patient's mother will call and schedule clinic appt   Patient expressed understanding of goal: yes  Action steps to achieve this goal:  1. Patient's mother will find time to call [place] scheduling   2. Patient's mother will schedule clinic appt  3. Patient/mother will address care gaps     Patient is declining Covid vaccine and HIV screening.                               Intervention and Education during outreach: Patient has completed her goals. Mom was granted guardianship and they just received the paperwork. Will bring a copy in to have on file.     CHW Plan: CHW will request chart review to move patient to maintenance.     Next outreach due: 9/10/24

## 2024-07-11 ENCOUNTER — PATIENT OUTREACH (OUTPATIENT)
Dept: CARE COORDINATION | Facility: CLINIC | Age: 19
End: 2024-07-11
Payer: COMMERCIAL

## 2024-07-11 NOTE — PROGRESS NOTES
Clinic Care Coordination Contact  Care Coordination Clinician Chart Review    Situation: Patient chart reviewed by Care Coordinator.       Background: Care Coordination Program started: 10/25/2023. Initial assessment completed and patient-centered care plan(s) were developed with participation from patient. Lead CC handed patient off to CHW for continued outreaches.       Assessment: Per chart review, patient outreach completed by CC CHW on 7/10/2024.  Patient/mother declined HIV and Covid vaccine, SW completed Health Maintenance goal.  Patient has completed her goals. Patient's mother was granted guardianship and they have just received related paperwork. Patient's mother will bring in a copy of guardianship papers for patient file. Patient is actively working to accomplish goal(s). Patient's goal(s) appropriate and relevant at this time. Patient is not due for updated Plan of Care.  Assessments will be completed annually or as needed/with change of patient status.         Plan/Recommendations: Patient will be moved to Maintenance. CHW will call in 2 months for update, SW will review 2 weeks after that call.     Plan of Care updated and sent to patient: No    GÉNESIS Vargas, MSW   [facility]  Care Coordination  [facility]  [phone]  7/11/2024 8:53 AM

## 2024-09-11 ENCOUNTER — PATIENT OUTREACH (OUTPATIENT)
Dept: CARE COORDINATION | Facility: CLINIC | Age: 19
End: 2024-09-11
Payer: COMMERCIAL

## 2024-09-11 NOTE — LETTER
[facility]  [address] [address]   September 11, 2024    Patty Alejandre  [address] [address]    To the parent and guardian of Patty Alejandre,     Your Care Team congratulates you on your journey to maintain wellness. This document will help guide you on your journey to maintain a healthy lifestyle.  You can use this to help you overcome any barriers you may encounter.  If you should have any questions or concerns, you can contact the members of your Care Team or contact your Primary Care Clinic for assistance.     Health Maintenance  Health Maintenance Reviewed:    Health Maintenance Due   Topic Date Due    HIV SCREENING  Never done    PHQ-2 (once per calendar year)  01/01/2024    INFLUENZA VACCINE (1) 09/01/2024    COVID-19 Vaccine (1 - 2023-24 season) Never done        My Access Plan  Medical Emergency 911   Primary Clinic Line [facility] [phone]   24 Hour Appointment Line [phone] or  [phone]) (toll-free)   24 Hour Nurse Line [phone] (toll-free)   Preferred Urgent Care     Preferred Hospital     Preferred [facility]acy [id number] [place], [place] - [address] [address]     Behavioral Health Crisis Line The National Suicide Prevention Lifeline at 1-412.960.4954 or 911     My Care Team Members  Patient Care Team         Relationship Specialty Notifications Start End    Katie Starks APRN CNP PCP - General Family Medicine  10/25/23     Phone: [phone] Fax: [phone]         [address] [address] [address]    Vaishnavi De Jesus BSW Lead Care Coordinator Primary Care - CC Admissions 10/26/23 9/11/24    Phone: [phone] Fax: [phone]        Katie Starks APRN CNP Assigned PCP   10/28/23     Phone: [phone] Fax: [phone]         [address] [address] [address]    Rimma Sharma CHW Community Health Worker Primary Care - CC Admissions  10/30/23 9/11/24    Phone: [phone] Fax: [phone]                  Advance Care Plans/Directives Type:      On file     It has been your Clinic Care Team's pleasure to work with you on accomplishing your goals.    Regards,  Your Clinic Care Team

## 2024-09-11 NOTE — PROGRESS NOTES
Clinic Care Coordination Contact  Community Health Worker Follow Up    Care Gaps:     Health Maintenance Due   Topic Date Due    HIV SCREENING  Never done    PHQ-2 (once per calendar year)  01/01/2024    INFLUENZA VACCINE (1) 09/01/2024    COVID-19 Vaccine (1 - 2023-24 season) Never done       Will schedule follow up visit before the end of the year    Care Plan:       Intervention and Education during outreach: CHW spoke with patient's mother Cynthia.     Patient is doing well, parents are now her legal guardians and they will bring that paperwork to her next visit to have on file.     She is attending school until the day before her 21nd birthday where she is learning life and job skills. She will remain living in her parents home as they believe she doesn't have the skills to be able to live independently.     Mom had no addition needs for [facility] at this time and patient is ready to graduate for care coordination.    CHW Plan: Request CC  chart review to graduate from [facility].     Rimma Sharma, MEENA  [place], [facility], [facility] [place] and [facility]  [phone]

## 2024-09-11 NOTE — PROGRESS NOTES
Clinic Care Coordination Contact  Care Coordination Clinician Chart Review    Situation: Patient chart reviewed by Care Coordinator.       Background: Care Coordination Program started: Initial assessment completed and patient-centered care plan(s) were developed with participation from patient. Lead CC handed patient off to CHW for continued outreaches.       Assessment: Per chart review, patient outreach completed by CC CHW on 9/11/2024.  Patient is actively working to accomplish goal(s). Patient's goal(s) appropriate and relevant at this time. Patient is not due for updated Plan of Care.  Assessments will be completed annually or as needed/with change of patient status.      Plan/Recommendations: The patient will continue working with Care Coordination to achieve goal(s) as above. CHW will continue outreaches at minimum every 30 days and will involve Lead CC as needed or if patient is ready to move to Maintenance. Lead CC will continue to monitor CHW outreaches and patient's progress to goal(s) every 6 weeks.    Per CHW note, Patient is doing well, parents are now her legal guardians and they will bring that paperwork to her next visit to have on file.      She is attending school until the day before her 21nd birthday where she is learning life and job skills. She will remain living in her parents home as they believe she doesn't have the skills to be able to live independently.      Mom had no addition needs for CCC at this time and patient is ready to graduate for care coordination.    SW will graduate patient from Care Coordination, will send graduation letter and close to CC.  Will update PCP    Vaishnavi De Jesus, LSW, MSW   [facility]  Care Coordination  [facility]  [phone]  9/11/2024 12:04 PM

## 2024-09-25 ENCOUNTER — PATIENT OUTREACH (OUTPATIENT)
Dept: CARE COORDINATION | Facility: CLINIC | Age: 19
End: 2024-09-25
Payer: COMMERCIAL

## 2024-10-09 ENCOUNTER — PATIENT OUTREACH (OUTPATIENT)
Dept: CARE COORDINATION | Facility: CLINIC | Age: 19
End: 2024-10-09
Payer: COMMERCIAL

## 2025-07-16 ENCOUNTER — TRANSFERRED RECORDS (OUTPATIENT)
Dept: HEALTH INFORMATION MANAGEMENT | Facility: CLINIC | Age: 20
End: 2025-07-16
Payer: COMMERCIAL